# Patient Record
Sex: MALE | Race: WHITE | NOT HISPANIC OR LATINO | Employment: OTHER | URBAN - METROPOLITAN AREA
[De-identification: names, ages, dates, MRNs, and addresses within clinical notes are randomized per-mention and may not be internally consistent; named-entity substitution may affect disease eponyms.]

---

## 2022-06-27 ENCOUNTER — OFFICE VISIT (OUTPATIENT)
Dept: CARDIAC SURGERY | Facility: CLINIC | Age: 77
End: 2022-06-27

## 2022-06-27 VITALS
HEART RATE: 64 BPM | SYSTOLIC BLOOD PRESSURE: 140 MMHG | TEMPERATURE: 97.5 F | OXYGEN SATURATION: 98 % | BODY MASS INDEX: 20.94 KG/M2 | WEIGHT: 158 LBS | DIASTOLIC BLOOD PRESSURE: 90 MMHG | HEIGHT: 73 IN

## 2022-06-27 DIAGNOSIS — I71.20 THORACIC AORTIC ANEURYSM WITHOUT RUPTURE: Primary | ICD-10-CM

## 2022-06-27 PROCEDURE — 99203 OFFICE O/P NEW LOW 30 MIN: CPT | Performed by: THORACIC SURGERY (CARDIOTHORACIC VASCULAR SURGERY)

## 2022-06-27 RX ORDER — LOSARTAN POTASSIUM 50 MG/1
TABLET ORAL DAILY
COMMUNITY
Start: 2022-05-22

## 2022-06-27 RX ORDER — LEVOTHYROXINE SODIUM 0.1 MG/1
TABLET ORAL DAILY
COMMUNITY
Start: 2022-05-22

## 2022-06-27 RX ORDER — ALIROCUMAB 75 MG/ML
INJECTION, SOLUTION SUBCUTANEOUS
COMMUNITY
Start: 2022-06-07

## 2022-06-27 RX ORDER — POTASSIUM CHLORIDE 750 MG/1
TABLET, FILM COATED, EXTENDED RELEASE ORAL DAILY
COMMUNITY
Start: 2022-06-07

## 2022-06-27 RX ORDER — TIOTROPIUM BROMIDE 18 UG/1
CAPSULE ORAL; RESPIRATORY (INHALATION) AS NEEDED
COMMUNITY
Start: 2022-05-29

## 2022-06-27 NOTE — PROGRESS NOTES
06/27/2022  Patient Information  Melissa JESSICA Whitlock                                                                                          [address] [facility] [address]   1945  'PCP/Referring Physician'  Sandra Agrawal MD  [phone]  Sandra Agrawal MD  [phone]  Chief Complaint   Patient presents with   • Consult     Former Patient Ref by Dr. Sandra Agrawal for Descending Aneurysm-Found on CT Chest for Hemoptysis Episode May 2022       History of Present Illness:   The patient is a 76 year-old male who was referred to me to evaluate his thoracic aortic aneurysm. He has a heavy smoking history and has emphysema.The patient is fairly well known to me, as, previously, I have performed an axillary to axillary bypass in June 23, 2015.  At this time, he has a CT scan of the chest which demonstrates a 4.8 cm ascending thoracic aorta and a 5.4 cm descending thoracic aorta.  He has no arm claudication, no dizziness and denies any stroke like symptoms.  He tells me he does have esophageal thickening and is scheduled for an endoscopy soon.      Patient Active Problem List   Diagnosis   • Thoracic aortic aneurysm without rupture (HCC)     Past Medical History:   Diagnosis Date   • Arthritis    • Descending aortic aneurysm (HCC)    • Emphysema lung (HCC)    • Hyperlipidemia    • Hypertension    • Peripheral vascular disease (HCC)    • Renal cyst    • Thyroid disease      Past Surgical History:   Procedure Laterality Date   • APPENDECTOMY     • AXILLARY AXILLARY BYPASS Bilateral 06/23/2015   • CERVICAL DISC SURGERY     • GALLBLADDER SURGERY     • OTHER SURGICAL HISTORY      calcium deposits removed   • VASCULAR SURGERY Left     Lower Extremity Stent       Current Outpatient Medications:   •  Breo Ellipta 200-25 MCG/INH inhaler, As Needed., Disp: , Rfl:   •  levothyroxine (SYNTHROID, LEVOTHROID) 100 MCG tablet, Daily., Disp: , Rfl:   •  losartan (COZAAR) 50 MG tablet, Daily., Disp: , Rfl:   •   potassium chloride 10 MEQ CR tablet, Daily., Disp: , Rfl:   •  Praluent 75 MG/ML solution auto-injector, Every 14 (Fourteen) Days., Disp: , Rfl:   •  Spiriva HandiHaler 18 MCG per inhalation capsule, As Needed., Disp: , Rfl:   No Known Allergies  Social History     Socioeconomic History   • Marital status:    • Number of children: 3   Tobacco Use   • Smoking status: Current Every Day Smoker     Packs/day: 0.75     Years: 53.00     Pack years: 39.75     Types: Cigarettes   • Smokeless tobacco: Never Used   Vaping Use   • Vaping Use: Never used   Substance and Sexual Activity   • Alcohol use: Not Currently     Comment: In the past   • Drug use: Never     Family History   Problem Relation Age of Onset   • Coronary artery disease Mother      Review of Systems   Constitutional: Positive for malaise/fatigue and weight loss (30 lbs since Jan 2022). Negative for chills, fever and night sweats.   HENT: Negative for hearing loss, odynophagia and sore throat.    Cardiovascular: Positive for dyspnea on exertion. Negative for chest pain, leg swelling, orthopnea and palpitations.   Respiratory: Positive for cough, hemoptysis (about 1 month ago), shortness of breath and wheezing.    Endocrine: Negative for cold intolerance, heat intolerance, polydipsia, polyphagia and polyuria.   Hematologic/Lymphatic: Bruises/bleeds easily.   Skin: Negative for itching and rash.   Musculoskeletal: Positive for arthritis and back pain. Negative for joint pain, joint swelling and myalgias.   Gastrointestinal: Positive for abdominal pain. Negative for constipation, diarrhea, hematemesis, hematochezia, melena, nausea and vomiting.   Genitourinary: Negative for dysuria, frequency and hematuria.   Neurological: Negative for focal weakness, headaches, numbness and seizures.   Psychiatric/Behavioral: Negative for suicidal ideas.   All other systems reviewed and are negative.    Vitals:    06/27/22 0830   BP: 140/90   BP Location: Right arm  "  Patient Position: Sitting   Pulse: 64   Temp: 97.5 °F (36.4 °C)   SpO2: 98%   Weight: 71.7 kg (158 lb)   Height: 185.4 cm (73\")      Physical Exam   CONSTITUTIONAL: Alert and conversant, Well dressed, Well nourished, No acute distress  EYES: Sclera clean, Anicteric, Pupils equal  ENT: No nasal deviation, Trachea midline  NECK: No neck masses, Supple  LUNGS: No wheezing, Cough, non-congested  HEART: No rubs, No murmurs  GASTROINTESTINAL: Soft, non-distended, No masses, Non tender  to palpation, normal bowel sounds  NEURO: No motor deficits, No sensory deficits, Cranial Nerves 2 through 12 grossly intact  PSYCHIATRIC: Oriented to person, place and time, No memory deficits, Mood appropriate  VASCULAR: No carotid bruits, Femoral pulses palpable and symmetric.  The radial pulses are palpable and symmetric  MUSKULOSKELETAL: No extremity trauma or extremity asymmetry    The ROS, past medical history, surgical history, family history, social history and vitals were reviewed by myself and corrected as needed.      Labs/Imaging:  I reviewed the patient's outside CT scan images and report.    Assessment/Plan:   The patient is a 76-year-old male who is fairly well-known to me as I performed an axillary to axillary bypass.  He does have an enlarged ascending aorta and an enlarged descending thoracic aorta.  However, none of those at this time meet the criteria for repair.  The ascending aorta should be 5.5 cm in maximum dimension and it is currently 4.8. This has changed little since his scan I located nearly 7 years ago.  The descending thoracic aorta data initially was for 6 to 6-1/2 cm prior to endovascular repair. In the region of the descending thoracic aorta, this patient is currently at 5.4.  I would like to repeat his scan in 1 year with 3D reconstruction and also look at his axillary to axillary bypass, which I performed.  He is agreeable to that plan and is apparently been lost to follow-up as he did not appear for " CT scans a number of years ago.  He and his family are both here and they have agreed to a repeat scan in 1 year and I will make that arrangement and I will follow-up.    Patient Active Problem List   Diagnosis   • Thoracic aortic aneurysm without rupture (HCC)       CC: MD Shannan Gramajo editing for Amrik Cleveland MD      I, Amrik Cleveland MD, have read and agree with the editing done by Shannan Desai, .

## 2023-02-16 ENCOUNTER — TRANSCRIBE ORDERS (OUTPATIENT)
Dept: MAMMOGRAPHY | Facility: CLINIC | Age: 78
End: 2023-02-16
Payer: MEDICARE

## 2023-02-16 DIAGNOSIS — M25.551 RIGHT HIP PAIN: Primary | ICD-10-CM

## 2023-05-04 DIAGNOSIS — I71.23 ANEURYSM OF DESCENDING THORACIC AORTA WITHOUT RUPTURE: ICD-10-CM

## 2023-05-04 DIAGNOSIS — I70.213 ATHEROSCLEROSIS OF NATIVE ARTERY OF BOTH LOWER EXTREMITIES WITH INTERMITTENT CLAUDICATION: Primary | ICD-10-CM

## 2025-07-02 ENCOUNTER — TELEPHONE (OUTPATIENT)
Dept: CARDIOLOGY | Facility: CLINIC | Age: 80
End: 2025-07-02
Payer: MEDICARE

## 2025-07-07 ENCOUNTER — OFFICE VISIT (OUTPATIENT)
Dept: CARDIOLOGY | Facility: CLINIC | Age: 80
End: 2025-07-07
Payer: MEDICARE

## 2025-07-07 VITALS
DIASTOLIC BLOOD PRESSURE: 50 MMHG | OXYGEN SATURATION: 96 % | WEIGHT: 152.8 LBS | BODY MASS INDEX: 20.7 KG/M2 | SYSTOLIC BLOOD PRESSURE: 118 MMHG | HEART RATE: 88 BPM | HEIGHT: 72 IN

## 2025-07-07 DIAGNOSIS — R06.09 DYSPNEA ON EXERTION: ICD-10-CM

## 2025-07-07 DIAGNOSIS — I25.10 CORONARY ARTERY DISEASE INVOLVING NATIVE CORONARY ARTERY OF NATIVE HEART WITHOUT ANGINA PECTORIS: ICD-10-CM

## 2025-07-07 DIAGNOSIS — Z72.0 TOBACCO ABUSE: Primary | ICD-10-CM

## 2025-07-07 DIAGNOSIS — I71.21 ANEURYSM OF ASCENDING AORTA WITHOUT RUPTURE: ICD-10-CM

## 2025-07-07 DIAGNOSIS — N18.31 STAGE 3A CHRONIC KIDNEY DISEASE: Primary | ICD-10-CM

## 2025-07-07 DIAGNOSIS — Z72.0 TOBACCO ABUSE: ICD-10-CM

## 2025-07-07 DIAGNOSIS — I73.9 PERIPHERAL ARTERY DISEASE: ICD-10-CM

## 2025-07-07 PROBLEM — J44.9 CHRONIC OBSTRUCTIVE PULMONARY DISEASE: Status: ACTIVE | Noted: 2023-10-03

## 2025-07-07 PROBLEM — N40.1 BENIGN PROSTATIC HYPERPLASIA WITH LOWER URINARY TRACT SYMPTOMS: Status: ACTIVE | Noted: 2021-04-30

## 2025-07-07 PROBLEM — E03.9 ACQUIRED HYPOTHYROIDISM: Status: ACTIVE | Noted: 2023-10-03

## 2025-07-07 PROBLEM — E78.5 HYPERLIPIDEMIA: Status: ACTIVE | Noted: 2025-07-07

## 2025-07-07 PROBLEM — I12.9 HYPERTENSIVE CHRONIC KIDNEY DISEASE WITH STAGE 1 THROUGH STAGE 4 CHRONIC KIDNEY DISEASE, OR UNSPECIFIED CHRONIC KIDNEY DISEASE: Status: ACTIVE | Noted: 2025-07-07

## 2025-07-07 PROBLEM — I65.29 ASYMPTOMATIC CAROTID ARTERY NARROWING WITHOUT INFARCTION: Status: ACTIVE | Noted: 2025-07-07

## 2025-07-07 PROBLEM — Q85.01: Status: ACTIVE | Noted: 2025-07-07

## 2025-07-07 PROCEDURE — G2211 COMPLEX E/M VISIT ADD ON: HCPCS | Performed by: INTERNAL MEDICINE

## 2025-07-07 PROCEDURE — 99204 OFFICE O/P NEW MOD 45 MIN: CPT | Performed by: INTERNAL MEDICINE

## 2025-07-07 RX ORDER — AMLODIPINE BESYLATE 10 MG/1
TABLET ORAL
COMMUNITY

## 2025-07-07 NOTE — PROGRESS NOTES
OFFICE VISIT  NOTE  [facility] CARDIOLOGY      Name: Melissa Whitlock    Date: 2025  MRN:  [id number]  :  1945      REFERRING/PRIMARY PROVIDER:  Sandra Agrawal MD    Chief Complaint   Patient presents with    Shortness of Breath       HPI: Melissa Whitlock is a 79 y.o. male who presents for chest pain with shortness of breath.  65-pack-year smoking history, hypertension, hyperlipidemia, peripheral arterial disease with history of left leg stents remote past, with right axillary to left axillary bypass surgery by Dr. 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  Reports progressive dyspnea on exertion over the past 2 to 3 months especially with the hot weather.  Still smokes less than 1 pack/day, has emphysema.  History of 4.8 cm ascending thoracic aorta day Hamiter and 5.4 cm descending thoracic aorta.    Past Medical History:   Diagnosis Date    Arthritis     Descending aortic aneurysm     Emphysema lung     Hyperlipidemia     Hypertension     Peripheral vascular disease     Renal cyst     Thyroid disease        Past Surgical History:   Procedure Laterality Date    APPENDECTOMY      AXILLARY AXILLARY BYPASS Bilateral 2015    CERVICAL DISC SURGERY      GALLBLADDER SURGERY      OTHER SURGICAL HISTORY      calcium deposits removed    VASCULAR SURGERY Left     Lower Extremity Stent       Social History     Socioeconomic History    Marital status:     Number of children: 3   Tobacco Use    Smoking status: Every Day     Current packs/day: 0.75     Average packs/day: 0.8 packs/day for 53.0 years (39.8 ttl pk-yrs)     Types: Cigarettes    Smokeless tobacco: Never   Vaping Use    Vaping status: Never Used   Substance and Sexual Activity    Alcohol use: Not Currently     Comment: In the past    Drug use: Never       Family History   Problem Relation Age of Onset    Coronary artery disease Mother         ROS:   Constitutional no fever,  no weight loss   Skin no  "rash, no subcutaneous nodules   Otolaryngeal no difficulty swallowing   Cardiovascular See HPI   Pulmonary no cough, no sputum production   Gastrointestinal no constipation, no diarrhea   Genitourinary no dysuria, no hematuria   Hematologic no easy bruisability, no abnormal bleeding   Musculoskeletal no muscle pain   Neurologic no dizziness, no falls         No Known Allergies      Current Outpatient Medications:     amLODIPine (NORVASC) 10 MG tablet, Take  by mouth., Disp: , Rfl:     Breo Ellipta 200-25 MCG/INH inhaler, As Needed., Disp: , Rfl:     levothyroxine (SYNTHROID, LEVOTHROID) 100 MCG tablet, Daily., Disp: , Rfl:     losartan (COZAAR) 50 MG tablet, Daily., Disp: , Rfl:     potassium chloride 10 MEQ CR tablet, Daily., Disp: , Rfl:     Praluent 75 MG/ML solution auto-injector, Every 14 (Fourteen) Days., Disp: , Rfl:     Spiriva HandiHaler 18 MCG per inhalation capsule, As Needed., Disp: , Rfl:     Vitals:    07/07/25 1508   BP: 118/50   Pulse: 88   SpO2: 96%   Weight: 69.3 kg (152 lb 12.8 oz)   Height: 182.9 cm (72\")     Body mass index is 20.72 kg/m².    PHYSICAL EXAM:    General Appearance:   well developed  well nourished  HENT:   oropharynx moist  lips not cyanotic  Neck:  thyroid not enlarged  supple  Respiratory:  no respiratory distress  normal breath sounds  no rales  Cardiovascular:  no jugular venous distention  regular rhythm  apical impulse normal  S1 normal, S2 normal  no S3, no S4   no murmur  no rub, no thrill  carotid pulses normal; no bruit  lower extremity edema: none      Musculoskeletal:  no clubbing of fingers.   normocephalic, head atraumatic  Skin:   warm, dry  Psychiatric:  judgement and insight appropriate  normal mood and affect    RESULTS:   Procedures          Labs:  No results found for: \"CHOL\", \"TRIG\", \"HDL\", \"LDL\", \"AST\", \"ALT\"  Lab Results   Component Value Date    HGBA1C 5.7 06/22/2015     No components found for: \"CREATINININE\"  No results found for: \"EGFRIFNONA\"    Most " recent PCP note, imaging tests, and labs reviewed.    ASSESSMENT:  Problem List Items Addressed This Visit       Thoracic aortic aneurysm without rupture    Coronary artery disease involving native heart without angina pectoris    Overview   2015 (Dr. Cleveland at [facility])   - CABG         Relevant Medications    amLODIPine (NORVASC) 10 MG tablet    Peripheral artery disease    Stage 3a chronic kidney disease - Primary    Dyspnea on exertion    Tobacco abuse    Overview   65 pack years, 1 ppd            PLAN:    1.  Dyspnea on exertion:  Given age, smoking history and peripheral arterial disease we will proceed with echo and exercise nuclear stress test.  If abnormal we will proceed with cardiac catheterization.    2.  Tobacco abuse:  We discussed the importance of complete tobacco cessation, offered assistance but he declined at this time.    3.  Hypertension:  Goal blood pressure less than 140/90  Continue current regimen with losartan and amlodipine.  Baseline heart rate around 57 therefore no beta-blocker.    4.  Hyperlipidemia:  Intolerant to statins, but doing well on Praluent, labs from 6/2025 showed total cholesterol 109, LDL 52.    Advance Care Planning   ACP discussion was held with the patient during this visit. Patient has an advance directive (not in EMR), copy requested.         Return to clinic in 6 months, or sooner as needed.    Thank you for the opportunity to share in the care of your patient; please do not hesitate to call me with any questions.     Amrik Garces MD, [facility], [facility]  Office: [phone] [address] [place] [address]    07/07/25

## 2025-07-17 ENCOUNTER — TRANSCRIBE ORDERS (OUTPATIENT)
Dept: ADMINISTRATIVE | Facility: HOSPITAL | Age: 80
End: 2025-07-17
Payer: MEDICARE

## 2025-07-17 DIAGNOSIS — I71.20 THORACIC AORTIC ANEURYSM WITHOUT RUPTURE, UNSPECIFIED PART: Primary | ICD-10-CM

## 2025-08-04 ENCOUNTER — TELEPHONE (OUTPATIENT)
Dept: CARDIOLOGY | Facility: CLINIC | Age: 80
End: 2025-08-04
Payer: MEDICARE

## 2025-08-05 ENCOUNTER — RESULTS FOLLOW-UP (OUTPATIENT)
Dept: CARDIOLOGY | Facility: CLINIC | Age: 80
End: 2025-08-05
Payer: MEDICARE

## 2025-08-08 ENCOUNTER — TELEPHONE (OUTPATIENT)
Dept: CARDIOLOGY | Facility: CLINIC | Age: 80
End: 2025-08-08
Payer: MEDICARE

## 2025-08-08 DIAGNOSIS — R55 NEAR SYNCOPE: Primary | ICD-10-CM
